# Patient Record
Sex: MALE | Race: WHITE | NOT HISPANIC OR LATINO
[De-identification: names, ages, dates, MRNs, and addresses within clinical notes are randomized per-mention and may not be internally consistent; named-entity substitution may affect disease eponyms.]

---

## 2017-02-20 ENCOUNTER — RX RENEWAL (OUTPATIENT)
Age: 52
End: 2017-02-20

## 2017-03-27 ENCOUNTER — RX RENEWAL (OUTPATIENT)
Age: 52
End: 2017-03-27

## 2017-04-26 ENCOUNTER — APPOINTMENT (OUTPATIENT)
Dept: CARDIOLOGY | Facility: CLINIC | Age: 52
End: 2017-04-26

## 2017-04-26 ENCOUNTER — NON-APPOINTMENT (OUTPATIENT)
Age: 52
End: 2017-04-26

## 2017-04-26 VITALS
SYSTOLIC BLOOD PRESSURE: 106 MMHG | WEIGHT: 224 LBS | BODY MASS INDEX: 32.07 KG/M2 | HEART RATE: 72 BPM | DIASTOLIC BLOOD PRESSURE: 62 MMHG | HEIGHT: 70 IN

## 2017-05-22 ENCOUNTER — RX RENEWAL (OUTPATIENT)
Age: 52
End: 2017-05-22

## 2017-09-12 ENCOUNTER — MEDICATION RENEWAL (OUTPATIENT)
Age: 52
End: 2017-09-12

## 2017-10-11 ENCOUNTER — APPOINTMENT (OUTPATIENT)
Dept: CARDIOLOGY | Facility: CLINIC | Age: 52
End: 2017-10-11
Payer: COMMERCIAL

## 2017-10-11 ENCOUNTER — NON-APPOINTMENT (OUTPATIENT)
Age: 52
End: 2017-10-11

## 2017-10-11 VITALS
HEART RATE: 80 BPM | DIASTOLIC BLOOD PRESSURE: 66 MMHG | BODY MASS INDEX: 31.07 KG/M2 | SYSTOLIC BLOOD PRESSURE: 122 MMHG | HEIGHT: 70 IN | WEIGHT: 217 LBS

## 2017-10-11 PROCEDURE — 99214 OFFICE O/P EST MOD 30 MIN: CPT

## 2017-10-11 PROCEDURE — 93000 ELECTROCARDIOGRAM COMPLETE: CPT

## 2018-03-06 ENCOUNTER — RX RENEWAL (OUTPATIENT)
Age: 53
End: 2018-03-06

## 2018-04-02 ENCOUNTER — RX RENEWAL (OUTPATIENT)
Age: 53
End: 2018-04-02

## 2018-04-25 ENCOUNTER — NON-APPOINTMENT (OUTPATIENT)
Age: 53
End: 2018-04-25

## 2018-04-25 ENCOUNTER — APPOINTMENT (OUTPATIENT)
Dept: CARDIOLOGY | Facility: CLINIC | Age: 53
End: 2018-04-25
Payer: COMMERCIAL

## 2018-04-25 VITALS
HEIGHT: 70 IN | BODY MASS INDEX: 30.64 KG/M2 | OXYGEN SATURATION: 99 % | WEIGHT: 214 LBS | SYSTOLIC BLOOD PRESSURE: 120 MMHG | DIASTOLIC BLOOD PRESSURE: 72 MMHG | HEART RATE: 80 BPM

## 2018-04-25 PROCEDURE — 93000 ELECTROCARDIOGRAM COMPLETE: CPT

## 2018-04-25 PROCEDURE — 99214 OFFICE O/P EST MOD 30 MIN: CPT

## 2018-05-30 ENCOUNTER — RX RENEWAL (OUTPATIENT)
Age: 53
End: 2018-05-30

## 2018-08-22 ENCOUNTER — MEDICATION RENEWAL (OUTPATIENT)
Age: 53
End: 2018-08-22

## 2018-08-26 ENCOUNTER — RX RENEWAL (OUTPATIENT)
Age: 53
End: 2018-08-26

## 2018-10-16 ENCOUNTER — NON-APPOINTMENT (OUTPATIENT)
Age: 53
End: 2018-10-16

## 2018-10-16 ENCOUNTER — APPOINTMENT (OUTPATIENT)
Dept: CARDIOLOGY | Facility: CLINIC | Age: 53
End: 2018-10-16
Payer: COMMERCIAL

## 2018-10-16 VITALS
BODY MASS INDEX: 30.21 KG/M2 | DIASTOLIC BLOOD PRESSURE: 77 MMHG | SYSTOLIC BLOOD PRESSURE: 118 MMHG | HEART RATE: 76 BPM | OXYGEN SATURATION: 100 % | HEIGHT: 70 IN | WEIGHT: 211 LBS

## 2018-10-16 PROCEDURE — 99214 OFFICE O/P EST MOD 30 MIN: CPT

## 2018-10-16 PROCEDURE — 93000 ELECTROCARDIOGRAM COMPLETE: CPT

## 2019-02-11 ENCOUNTER — RX RENEWAL (OUTPATIENT)
Age: 54
End: 2019-02-11

## 2019-03-11 ENCOUNTER — RX RENEWAL (OUTPATIENT)
Age: 54
End: 2019-03-11

## 2019-03-12 ENCOUNTER — TRANSCRIPTION ENCOUNTER (OUTPATIENT)
Age: 54
End: 2019-03-12

## 2019-04-17 ENCOUNTER — APPOINTMENT (OUTPATIENT)
Dept: CARDIOLOGY | Facility: CLINIC | Age: 54
End: 2019-04-17
Payer: COMMERCIAL

## 2019-04-17 ENCOUNTER — NON-APPOINTMENT (OUTPATIENT)
Age: 54
End: 2019-04-17

## 2019-04-17 VITALS
BODY MASS INDEX: 31.42 KG/M2 | HEART RATE: 75 BPM | DIASTOLIC BLOOD PRESSURE: 74 MMHG | OXYGEN SATURATION: 99 % | WEIGHT: 219 LBS | SYSTOLIC BLOOD PRESSURE: 124 MMHG

## 2019-04-17 PROCEDURE — 99214 OFFICE O/P EST MOD 30 MIN: CPT

## 2019-04-17 PROCEDURE — 93000 ELECTROCARDIOGRAM COMPLETE: CPT

## 2019-04-17 RX ORDER — AMOXICILLIN 875 MG/1
875 TABLET, FILM COATED ORAL
Qty: 20 | Refills: 0 | Status: COMPLETED | COMMUNITY
Start: 2018-12-31

## 2019-04-17 NOTE — DISCUSSION/SUMMARY
[Coronary Artery Disease] : coronary artery disease [Stable] : stable [Hyperlipidemia] : hyperlipidemia [None] : none [de-identified] : weighing risks and benefits will continue DAPT [Patient] : the patient

## 2019-04-17 NOTE — CARDIOLOGY SUMMARY
[___] : [unfilled] [LVEF ___%] : LVEF [unfilled]% [Mild] : mild LV dysfunction [Normal] : normal LA size [None] : no mitral regurgitation

## 2019-04-17 NOTE — HISTORY OF PRESENT ILLNESS
[FreeTextEntry1] : Mr. Kain Booker is a 54 year old man who experienced breathlessness and a vague discomfort in his throat while exercising in gym, electrocardiogram abnormal, cardiac enzymes elevated and had drug-eluting stent to the proximal left anterior descending 99% lesion. History of hyperlipidemia and diabetes, lost weight, exercised regularly and says results improved and medications were discontinued. There is no family history of cardiac disease and he is a nonsmoker. Fully active without any chest discomfort or dyspnea, walking good weather and bad up to 7 miles a day, often on golf course. He is a former [place]  retired on disability.

## 2019-04-17 NOTE — PHYSICAL EXAM
[General Appearance - Well Developed] : well developed [Normal Appearance] : normal appearance [Well Groomed] : well groomed [No Deformities] : no deformities [General Appearance - Well Nourished] : well nourished [General Appearance - In No Acute Distress] : no acute distress [Normal Conjunctiva] : the conjunctiva exhibited no abnormalities [Eyelids - No Xanthelasma] : the eyelids demonstrated no xanthelasmas [Normal Oral Mucosa] : normal oral mucosa [No Oral Pallor] : no oral pallor [No Oral Cyanosis] : no oral cyanosis [Normal Jugular Venous A Waves Present] : normal jugular venous A waves present [Normal Jugular Venous V Waves Present] : normal jugular venous V waves present [No Jugular Venous Mota A Waves] : no jugular venous mota A waves [Respiration, Rhythm And Depth] : normal respiratory rhythm and effort [Exaggerated Use Of Accessory Muscles For Inspiration] : no accessory muscle use [Auscultation Breath Sounds / Voice Sounds] : lungs were clear to auscultation bilaterally [Not Palpable] : not palpable [Normal Rate] : normal [Rhythm Regular] : regular [Normal S1] : normal S1 [Normal S2] : normal S2 [No Gallop] : no gallop heard [No Murmur] : no murmurs heard [No Abnormalities] : the abdominal aorta was not enlarged and no bruit was heard [2+] : right 2+ [No Pitting Edema] : no pitting edema present [Abdomen Soft] : soft [Abdomen Tenderness] : non-tender [Abdomen Mass (___ Cm)] : no abdominal mass palpated [Abnormal Walk] : normal gait [Gait - Sufficient For Exercise Testing] : the gait was sufficient for exercise testing [Nail Clubbing] : no clubbing of the fingernails [Cyanosis, Localized] : no localized cyanosis [Petechial Hemorrhages (___cm)] : no petechial hemorrhages [Skin Color & Pigmentation] : normal skin color and pigmentation [] : no rash [Skin Lesions] : no skin lesions [No Venous Stasis] : no venous stasis [No Xanthoma] : no  xanthoma was observed [No Skin Ulcers] : no skin ulcer [Oriented To Time, Place, And Person] : oriented to person, place, and time [Affect] : the affect was normal [No Anxiety] : not feeling anxious [Mood] : the mood was normal [Click] : no click [Right Carotid Bruit] : no bruit heard over the right carotid [Left Carotid Bruit] : no bruit heard over the left carotid

## 2019-04-17 NOTE — REVIEW OF SYSTEMS
[Joint Pain] : joint pain [Negative] : Heme/Lymph [Shortness Of Breath] : no shortness of breath [Dyspnea on exertion] : not dyspnea during exertion [Chest Pain] : no chest pain [Palpitations] : no palpitations [Leg Claudication] : no intermittent leg claudication

## 2019-05-08 ENCOUNTER — RX RENEWAL (OUTPATIENT)
Age: 54
End: 2019-05-08

## 2019-09-24 ENCOUNTER — RX RENEWAL (OUTPATIENT)
Age: 54
End: 2019-09-24

## 2019-10-16 ENCOUNTER — NON-APPOINTMENT (OUTPATIENT)
Age: 54
End: 2019-10-16

## 2019-10-16 ENCOUNTER — APPOINTMENT (OUTPATIENT)
Dept: CARDIOLOGY | Facility: CLINIC | Age: 54
End: 2019-10-16
Payer: COMMERCIAL

## 2019-10-16 VITALS
OXYGEN SATURATION: 97 % | DIASTOLIC BLOOD PRESSURE: 60 MMHG | BODY MASS INDEX: 30.99 KG/M2 | SYSTOLIC BLOOD PRESSURE: 110 MMHG | HEART RATE: 77 BPM | WEIGHT: 216 LBS

## 2019-10-16 PROCEDURE — 93000 ELECTROCARDIOGRAM COMPLETE: CPT

## 2019-10-16 PROCEDURE — 99215 OFFICE O/P EST HI 40 MIN: CPT

## 2019-10-16 NOTE — DISCUSSION/SUMMARY
[Coronary Artery Disease] : coronary artery disease [Hyperlipidemia] : hyperlipidemia [None] : none [Stable] : stable [de-identified] : weighing risks and benefits will continue DAPT

## 2019-10-16 NOTE — REVIEW OF SYSTEMS
[Joint Pain] : joint pain [Negative] : Endocrine [Shortness Of Breath] : no shortness of breath [Chest Pain] : no chest pain [Dyspnea on exertion] : not dyspnea during exertion [Leg Claudication] : no intermittent leg claudication [Palpitations] : no palpitations

## 2019-10-16 NOTE — PHYSICAL EXAM
[General Appearance - Well Developed] : well developed [Normal Appearance] : normal appearance [Well Groomed] : well groomed [General Appearance - Well Nourished] : well nourished [No Deformities] : no deformities [General Appearance - In No Acute Distress] : no acute distress [Normal Conjunctiva] : the conjunctiva exhibited no abnormalities [No Oral Pallor] : no oral pallor [Normal Oral Mucosa] : normal oral mucosa [Eyelids - No Xanthelasma] : the eyelids demonstrated no xanthelasmas [No Oral Cyanosis] : no oral cyanosis [Normal Jugular Venous A Waves Present] : normal jugular venous A waves present [Normal Jugular Venous V Waves Present] : normal jugular venous V waves present [No Jugular Venous Mota A Waves] : no jugular venous mota A waves [Respiration, Rhythm And Depth] : normal respiratory rhythm and effort [Exaggerated Use Of Accessory Muscles For Inspiration] : no accessory muscle use [Not Palpable] : not palpable [Auscultation Breath Sounds / Voice Sounds] : lungs were clear to auscultation bilaterally [Rhythm Regular] : regular [Normal Rate] : normal [Normal S1] : normal S1 [Normal S2] : normal S2 [No Gallop] : no gallop heard [No Murmur] : no murmurs heard [2+] : left 2+ [No Abnormalities] : the abdominal aorta was not enlarged and no bruit was heard [No Pitting Edema] : no pitting edema present [Abdomen Soft] : soft [Abdomen Tenderness] : non-tender [Abnormal Walk] : normal gait [Abdomen Mass (___ Cm)] : no abdominal mass palpated [Nail Clubbing] : no clubbing of the fingernails [Gait - Sufficient For Exercise Testing] : the gait was sufficient for exercise testing [Petechial Hemorrhages (___cm)] : no petechial hemorrhages [Cyanosis, Localized] : no localized cyanosis [Skin Color & Pigmentation] : normal skin color and pigmentation [] : no rash [No Venous Stasis] : no venous stasis [Skin Lesions] : no skin lesions [No Skin Ulcers] : no skin ulcer [No Xanthoma] : no  xanthoma was observed [Oriented To Time, Place, And Person] : oriented to person, place, and time [Affect] : the affect was normal [Mood] : the mood was normal [No Anxiety] : not feeling anxious [Click] : no click [Right Carotid Bruit] : no bruit heard over the right carotid [Left Carotid Bruit] : no bruit heard over the left carotid

## 2020-01-18 ENCOUNTER — RX RENEWAL (OUTPATIENT)
Age: 55
End: 2020-01-18

## 2020-02-18 ENCOUNTER — RX RENEWAL (OUTPATIENT)
Age: 55
End: 2020-02-18

## 2020-04-15 ENCOUNTER — APPOINTMENT (OUTPATIENT)
Dept: CARDIOLOGY | Facility: CLINIC | Age: 55
End: 2020-04-15

## 2020-04-19 ENCOUNTER — RX RENEWAL (OUTPATIENT)
Age: 55
End: 2020-04-19

## 2020-06-10 ENCOUNTER — APPOINTMENT (OUTPATIENT)
Dept: CARDIOLOGY | Facility: CLINIC | Age: 55
End: 2020-06-10
Payer: COMMERCIAL

## 2020-06-10 ENCOUNTER — NON-APPOINTMENT (OUTPATIENT)
Age: 55
End: 2020-06-10

## 2020-06-10 VITALS
SYSTOLIC BLOOD PRESSURE: 118 MMHG | HEIGHT: 70 IN | TEMPERATURE: 97.7 F | OXYGEN SATURATION: 99 % | BODY MASS INDEX: 30.06 KG/M2 | HEART RATE: 62 BPM | WEIGHT: 210 LBS | DIASTOLIC BLOOD PRESSURE: 73 MMHG | RESPIRATION RATE: 17 BRPM

## 2020-06-10 PROCEDURE — 93000 ELECTROCARDIOGRAM COMPLETE: CPT

## 2020-06-10 PROCEDURE — 99214 OFFICE O/P EST MOD 30 MIN: CPT

## 2020-06-10 NOTE — PHYSICAL EXAM
[General Appearance - Well Developed] : well developed [Normal Appearance] : normal appearance [Well Groomed] : well groomed [General Appearance - Well Nourished] : well nourished [No Deformities] : no deformities [General Appearance - In No Acute Distress] : no acute distress [Normal Conjunctiva] : the conjunctiva exhibited no abnormalities [Eyelids - No Xanthelasma] : the eyelids demonstrated no xanthelasmas [Normal Oral Mucosa] : normal oral mucosa [No Oral Pallor] : no oral pallor [No Oral Cyanosis] : no oral cyanosis [Normal Jugular Venous A Waves Present] : normal jugular venous A waves present [Normal Jugular Venous V Waves Present] : normal jugular venous V waves present [No Jugular Venous Mota A Waves] : no jugular venous mota A waves [Respiration, Rhythm And Depth] : normal respiratory rhythm and effort [Exaggerated Use Of Accessory Muscles For Inspiration] : no accessory muscle use [Auscultation Breath Sounds / Voice Sounds] : lungs were clear to auscultation bilaterally [Not Palpable] : not palpable [Normal Rate] : normal [Rhythm Regular] : regular [Normal S1] : normal S1 [Normal S2] : normal S2 [No Gallop] : no gallop heard [No Murmur] : no murmurs heard [2+] : left 2+ [No Abnormalities] : the abdominal aorta was not enlarged and no bruit was heard [No Pitting Edema] : no pitting edema present [Abdomen Soft] : soft [Abdomen Tenderness] : non-tender [Abdomen Mass (___ Cm)] : no abdominal mass palpated [Abnormal Walk] : normal gait [Gait - Sufficient For Exercise Testing] : the gait was sufficient for exercise testing [Nail Clubbing] : no clubbing of the fingernails [Cyanosis, Localized] : no localized cyanosis [Petechial Hemorrhages (___cm)] : no petechial hemorrhages [Skin Color & Pigmentation] : normal skin color and pigmentation [] : no rash [No Venous Stasis] : no venous stasis [Skin Lesions] : no skin lesions [No Skin Ulcers] : no skin ulcer [No Xanthoma] : no  xanthoma was observed [Oriented To Time, Place, And Person] : oriented to person, place, and time [Affect] : the affect was normal [Mood] : the mood was normal [No Anxiety] : not feeling anxious [Click] : no click [Right Carotid Bruit] : no bruit heard over the right carotid [Left Carotid Bruit] : no bruit heard over the left carotid

## 2020-06-10 NOTE — REVIEW OF SYSTEMS
[Joint Pain] : joint pain [Negative] : Heme/Lymph [Shortness Of Breath] : no shortness of breath [Dyspnea on exertion] : not dyspnea during exertion [Chest Pain] : no chest pain [Leg Claudication] : no intermittent leg claudication [Palpitations] : no palpitations

## 2020-06-10 NOTE — DISCUSSION/SUMMARY
[Coronary Artery Disease] : coronary artery disease [Hyperlipidemia] : hyperlipidemia [Stable] : stable [None] : none [Patient] : the patient [de-identified] : each visit weighing risks and benefits will continue DAPT

## 2020-06-10 NOTE — HISTORY OF PRESENT ILLNESS
[FreeTextEntry1] : Mr. Kain Booker is a 55 year old man who experienced breathlessness and a vague discomfort in his throat while exercising in gym, electrocardiogram abnormal, cardiac enzymes elevated and had drug-eluting stent to the proximal left anterior descending artery 99% lesion. History of hyperlipidemia and diabetes, lost weight, exercised regularly and says results improved and medications were discontinued. There is no family history of cardiac disease and he is a nonsmoker. Fully active without any chest discomfort or dyspnea, walking in good weather and bad up to 7 miles a day, often on golf course. He is a former [place]  retired on disability.\par \par At visit today confirms all above unchanged, remains very active and unrestricted.

## 2020-09-12 ENCOUNTER — RX RENEWAL (OUTPATIENT)
Age: 55
End: 2020-09-12

## 2020-12-08 ENCOUNTER — NON-APPOINTMENT (OUTPATIENT)
Age: 55
End: 2020-12-08

## 2020-12-08 ENCOUNTER — APPOINTMENT (OUTPATIENT)
Dept: CARDIOLOGY | Facility: CLINIC | Age: 55
End: 2020-12-08
Payer: COMMERCIAL

## 2020-12-08 VITALS
SYSTOLIC BLOOD PRESSURE: 122 MMHG | OXYGEN SATURATION: 97 % | WEIGHT: 210 LBS | DIASTOLIC BLOOD PRESSURE: 70 MMHG | HEIGHT: 70 IN | BODY MASS INDEX: 30.06 KG/M2 | TEMPERATURE: 97.3 F | HEART RATE: 80 BPM

## 2020-12-08 PROCEDURE — 99214 OFFICE O/P EST MOD 30 MIN: CPT

## 2020-12-08 PROCEDURE — 93000 ELECTROCARDIOGRAM COMPLETE: CPT

## 2020-12-08 PROCEDURE — 99072 ADDL SUPL MATRL&STAF TM PHE: CPT

## 2020-12-08 NOTE — DISCUSSION/SUMMARY
[Hyperlipidemia] : hyperlipidemia [Patient] : the patient [Coronary Artery Disease] : coronary artery disease [Stable] : stable [None] : none [de-identified] : each visit weighing risks and benefits will continue DAPT

## 2020-12-08 NOTE — HISTORY OF PRESENT ILLNESS
[FreeTextEntry1] : Mr. Kain Booker is a 55 year old man who experienced breathlessness and a vague discomfort in his throat while exercising in gym, electrocardiogram abnormal, cardiac enzymes elevated and had drug-eluting stent to the proximal left anterior descending artery 99% lesion. History of hyperlipidemia and diabetes, lost weight, exercised regularly and says results improved and medications were discontinued. There is no family history of cardiac disease and he is a nonsmoker. Fully active without any chest discomfort or dyspnea, walking in good weather and bad up to 7 miles a day, often on golf course. He is a former [place]  retired on disability.

## 2021-01-17 ENCOUNTER — RX RENEWAL (OUTPATIENT)
Age: 56
End: 2021-01-17

## 2021-02-23 ENCOUNTER — RX RENEWAL (OUTPATIENT)
Age: 56
End: 2021-02-23

## 2021-03-15 ENCOUNTER — NON-APPOINTMENT (OUTPATIENT)
Age: 56
End: 2021-03-15

## 2021-03-15 ENCOUNTER — TRANSCRIPTION ENCOUNTER (OUTPATIENT)
Age: 56
End: 2021-03-15

## 2021-03-23 ENCOUNTER — APPOINTMENT (OUTPATIENT)
Dept: CARDIOLOGY | Facility: CLINIC | Age: 56
End: 2021-03-23
Payer: COMMERCIAL

## 2021-03-23 ENCOUNTER — NON-APPOINTMENT (OUTPATIENT)
Age: 56
End: 2021-03-23

## 2021-03-23 VITALS
TEMPERATURE: 98.1 F | WEIGHT: 218 LBS | BODY MASS INDEX: 31.21 KG/M2 | HEIGHT: 70 IN | SYSTOLIC BLOOD PRESSURE: 135 MMHG | OXYGEN SATURATION: 100 % | DIASTOLIC BLOOD PRESSURE: 82 MMHG | HEART RATE: 84 BPM

## 2021-03-23 DIAGNOSIS — Z01.810 ENCOUNTER FOR PREPROCEDURAL CARDIOVASCULAR EXAMINATION: ICD-10-CM

## 2021-03-23 PROCEDURE — 93000 ELECTROCARDIOGRAM COMPLETE: CPT

## 2021-03-23 PROCEDURE — 99215 OFFICE O/P EST HI 40 MIN: CPT

## 2021-03-23 PROCEDURE — 99072 ADDL SUPL MATRL&STAF TM PHE: CPT

## 2021-03-23 NOTE — DISCUSSION/SUMMARY
[Coronary Artery Disease] : coronary artery disease [Medication Changes Per Orders] : as documented in orders [Hyperlipidemia] : hyperlipidemia [Stable] : stable [None] : none [Patient] : the patient [de-identified] : now years since stent placement will discontinue prasugrel and maintain on aspirin alone [FreeTextEntry1] : \par Acute anterior wall MI 7 years ago, stent placement and excellent recovery with preserved ventricular function and no recurrent myocardial ischemia. Has never manifested heart failure, has no valvular heart disease and rhythm is stable. Thus there is no contraindication to gastrointestinal endoscopic procedure and sedation/analgesia. Prasugrel has been discontinued and will not be resumed, no longer indicated this many years since stent placement. He will remain on aspirin and other cardiac medications.

## 2021-03-23 NOTE — HISTORY OF PRESENT ILLNESS
[FreeTextEntry1] : Mr. Kain Booker is a 56 year old man who experienced breathlessness and a vague discomfort in his throat while exercising in gym, electrocardiogram abnormal, cardiac enzymes elevated and had drug-eluting stent to the proximal left anterior descending artery 99% lesion. History of hyperlipidemia and diabetes, lost weight, exercised regularly. There is no family history of cardiac disease and he is a nonsmoker. Fully active without any chest discomfort or dyspnea, walking in good weather and bad up to 7 miles a day, often on golf course. He is a former [place]  retired on disability.

## 2021-03-23 NOTE — REASON FOR VISIT
[Follow-Up - Clinic] : a clinic follow-up of [Coronary Artery Disease] : coronary artery disease [Hyperlipidemia] : hyperlipidemia [FreeTextEntry1] : Pre-procedure cardiovascular evaluation

## 2021-06-10 ENCOUNTER — NON-APPOINTMENT (OUTPATIENT)
Age: 56
End: 2021-06-10

## 2021-06-10 ENCOUNTER — APPOINTMENT (OUTPATIENT)
Dept: CARDIOLOGY | Facility: CLINIC | Age: 56
End: 2021-06-10
Payer: COMMERCIAL

## 2021-06-10 VITALS
TEMPERATURE: 98 F | HEIGHT: 70 IN | BODY MASS INDEX: 30.49 KG/M2 | DIASTOLIC BLOOD PRESSURE: 78 MMHG | SYSTOLIC BLOOD PRESSURE: 124 MMHG | HEART RATE: 75 BPM | OXYGEN SATURATION: 100 % | RESPIRATION RATE: 17 BRPM | WEIGHT: 213 LBS

## 2021-06-10 PROCEDURE — 93000 ELECTROCARDIOGRAM COMPLETE: CPT

## 2021-06-10 PROCEDURE — 99214 OFFICE O/P EST MOD 30 MIN: CPT

## 2021-06-10 NOTE — DISCUSSION/SUMMARY
[Coronary Artery Disease] : coronary artery disease [Medication Changes Per Orders] : Medication changes are as documented in orders [Hyperlipidemia] : hyperlipidemia [Stable] : stable [None] : There are no changes in medication management [Patient] : the patient [de-identified] : stop prasugrel, 7 years since stent, actually stopped prior to colonoscopy 3 months ago and has not resumed

## 2021-06-10 NOTE — HISTORY OF PRESENT ILLNESS
[FreeTextEntry1] : Mr. Kain Booker is a 56 year old man who experienced breathlessness and a vague discomfort in his throat while exercising in gym, electrocardiogram abnormal, cardiac enzymes elevated and had drug-eluting stent to the proximal left anterior descending artery 99% lesion. History of hyperlipidemia and diabetes, lost weight, exercised regularly. There is no family history of cardiac disease and he is a nonsmoker. Fully active without any chest discomfort or dyspnea, walking in good weather and bad up to 4-5 miles a day, often on golf course. He is a former [place]  retired on disability.

## 2021-06-10 NOTE — CARDIOLOGY SUMMARY
[___] : [unfilled] [LVEF ___%] : LVEF [unfilled]% [Mild] : mild LV dysfunction [Normal] : normal LA size [None] : no mitral regurgitation [de-identified] : 6/10/2021 normal trace [de-identified] : 3/26/2014 RADAMES to 95% proximal LAD stenosis (reviewed angio 6/10/2021)

## 2021-06-10 NOTE — PHYSICAL EXAM
[Not Palpable] : not palpable [Normal Rate] : normal [Rhythm Regular] : regular [Normal S1] : normal S1 [Normal S2] : normal S2 [No Murmur] : no murmurs heard [No Pitting Edema] : no pitting edema present [2+] : left 2+ [No Abnormalities] : the abdominal aorta was not enlarged and no bruit was heard [Normal] : alert and oriented, normal memory [Right Carotid Bruit] : no bruit heard over the right carotid [Left Carotid Bruit] : no bruit heard over the left carotid

## 2021-06-16 ENCOUNTER — RX RENEWAL (OUTPATIENT)
Age: 56
End: 2021-06-16

## 2021-12-09 ENCOUNTER — APPOINTMENT (OUTPATIENT)
Dept: CARDIOLOGY | Facility: CLINIC | Age: 56
End: 2021-12-09
Payer: COMMERCIAL

## 2021-12-09 ENCOUNTER — NON-APPOINTMENT (OUTPATIENT)
Age: 56
End: 2021-12-09

## 2021-12-09 VITALS
WEIGHT: 219 LBS | HEIGHT: 70 IN | DIASTOLIC BLOOD PRESSURE: 78 MMHG | HEART RATE: 72 BPM | SYSTOLIC BLOOD PRESSURE: 120 MMHG | OXYGEN SATURATION: 99 % | BODY MASS INDEX: 31.35 KG/M2

## 2021-12-09 PROCEDURE — 99214 OFFICE O/P EST MOD 30 MIN: CPT

## 2021-12-09 PROCEDURE — 93000 ELECTROCARDIOGRAM COMPLETE: CPT

## 2021-12-09 NOTE — DISCUSSION/SUMMARY
[Coronary Artery Disease] : coronary artery disease [Hyperlipidemia] : hyperlipidemia [Responding to Treatment] : responding to treatment [None] : There are no changes in medication management

## 2021-12-09 NOTE — CARDIOLOGY SUMMARY
[___] : [unfilled] [LVEF ___%] : LVEF [unfilled]% [Mild] : mild LV dysfunction [Normal] : normal LA size [None] : no mitral regurgitation [___] : [unfilled] [de-identified] : 6/10/2021 normal trace\par 12/9/2021 sinus rhythm, normal trace, unchanged. [de-identified] : 3/26/2014 RADAMES to 95% proximal LAD stenosis (reviewed angio 6/10/2021)

## 2021-12-18 ENCOUNTER — RX RENEWAL (OUTPATIENT)
Age: 56
End: 2021-12-18

## 2022-04-21 ENCOUNTER — RX RENEWAL (OUTPATIENT)
Age: 57
End: 2022-04-21

## 2022-06-09 ENCOUNTER — APPOINTMENT (OUTPATIENT)
Dept: CARDIOLOGY | Facility: CLINIC | Age: 57
End: 2022-06-09
Payer: COMMERCIAL

## 2022-06-09 ENCOUNTER — NON-APPOINTMENT (OUTPATIENT)
Age: 57
End: 2022-06-09

## 2022-06-09 VITALS — HEART RATE: 64 BPM | SYSTOLIC BLOOD PRESSURE: 128 MMHG | DIASTOLIC BLOOD PRESSURE: 70 MMHG

## 2022-06-09 VITALS
HEART RATE: 64 BPM | DIASTOLIC BLOOD PRESSURE: 70 MMHG | WEIGHT: 219 LBS | OXYGEN SATURATION: 98 % | SYSTOLIC BLOOD PRESSURE: 132 MMHG | HEIGHT: 70 IN | BODY MASS INDEX: 31.35 KG/M2

## 2022-06-09 PROCEDURE — 99214 OFFICE O/P EST MOD 30 MIN: CPT

## 2022-06-09 PROCEDURE — 93000 ELECTROCARDIOGRAM COMPLETE: CPT

## 2022-06-09 NOTE — HISTORY OF PRESENT ILLNESS
[FreeTextEntry1] : Mr. Kain Booker is a 57 year old man who experienced breathlessness and a vague discomfort in his throat while exercising in gym, electrocardiogram abnormal, cardiac enzymes elevated and had drug-eluting stent to the proximal left anterior descending artery 99% lesion. History of hyperlipidemia and diabetes, lost weight, exercised regularly. There is no family history of cardiac disease and he is a nonsmoker. Fully active without any chest discomfort or dyspnea, walking in good weather and bad up to 4-5 miles a day, often on golf course. He is a former [place]  retired on disability.\par \par Moved to [place], [place] and is active all year round, walking, playing golf and has no symptoms.

## 2022-06-09 NOTE — DISCUSSION/SUMMARY
[Coronary Artery Disease] : coronary artery disease [Hyperlipidemia] : hyperlipidemia [Responding to Treatment] : responding to treatment [None] : There are no changes in medication management [Patient] : the patient

## 2022-06-09 NOTE — CARDIOLOGY SUMMARY
[___] : [unfilled] [LVEF ___%] : LVEF [unfilled]% [Mild] : mild LV dysfunction [Normal] : normal LA size [None] : no mitral regurgitation [de-identified] : 6/10/2021 normal trace\par 12/9/2021 sinus rhythm, normal trace, unchanged.\par 6/9/2022 sinus rhythm, unchanged. [de-identified] : 3/26/2014 RADAMES to 95% proximal LAD stenosis (reviewed angio 6/10/2021)

## 2022-06-16 ENCOUNTER — RX RENEWAL (OUTPATIENT)
Age: 57
End: 2022-06-16

## 2022-06-20 ENCOUNTER — RX RENEWAL (OUTPATIENT)
Age: 57
End: 2022-06-20

## 2022-12-22 ENCOUNTER — RX RENEWAL (OUTPATIENT)
Age: 57
End: 2022-12-22

## 2022-12-24 ENCOUNTER — NON-APPOINTMENT (OUTPATIENT)
Age: 57
End: 2022-12-24

## 2022-12-29 ENCOUNTER — APPOINTMENT (OUTPATIENT)
Dept: CARDIOLOGY | Facility: CLINIC | Age: 57
End: 2022-12-29

## 2022-12-29 ENCOUNTER — NON-APPOINTMENT (OUTPATIENT)
Age: 57
End: 2022-12-29

## 2022-12-29 VITALS
HEART RATE: 65 BPM | WEIGHT: 217 LBS | HEIGHT: 70 IN | BODY MASS INDEX: 31.07 KG/M2 | DIASTOLIC BLOOD PRESSURE: 67 MMHG | SYSTOLIC BLOOD PRESSURE: 109 MMHG | OXYGEN SATURATION: 98 %

## 2022-12-29 DIAGNOSIS — E11.9 TYPE 2 DIABETES MELLITUS W/OUT COMPLICATIONS: ICD-10-CM

## 2022-12-29 DIAGNOSIS — I21.09 ST ELEVATION (STEMI) MYOCARDIAL INFARCTION INVOLVING OTHER CORONARY ARTERY OF ANTERIOR WALL: ICD-10-CM

## 2022-12-29 PROCEDURE — 99214 OFFICE O/P EST MOD 30 MIN: CPT | Mod: 25

## 2022-12-29 PROCEDURE — 93000 ELECTROCARDIOGRAM COMPLETE: CPT

## 2022-12-29 RX ORDER — METFORMIN HYDROCHLORIDE 1000 MG/1
1000 TABLET, COATED ORAL
Refills: 0 | Status: ACTIVE | COMMUNITY
Start: 2022-12-29

## 2022-12-29 RX ORDER — EMPAGLIFLOZIN 10 MG/1
10 TABLET, FILM COATED ORAL
Qty: 90 | Refills: 3 | Status: ACTIVE | COMMUNITY
Start: 2022-12-29

## 2022-12-29 NOTE — DISCUSSION/SUMMARY
[Patient] : the patient [EKG obtained to assist in diagnosis and management of assessed problem(s)] : EKG obtained to assist in diagnosis and management of assessed problem(s) [Coronary Artery Disease] : coronary artery disease [Hyperlipidemia] : hyperlipidemia [Responding to Treatment] : responding to treatment [None] : There are no changes in medication management

## 2022-12-29 NOTE — CARDIOLOGY SUMMARY
[___] : [unfilled] [LVEF ___%] : LVEF [unfilled]% [Mild] : mild LV dysfunction [Normal] : normal LA size [None] : no mitral regurgitation [de-identified] : 6/10/2021 normal trace\par 12/9/2021 sinus rhythm, normal trace, unchanged.\par 6/9/2022 sinus rhythm, unchanged.\par 12/29/2022 sinus rhythm, unchanged. [de-identified] : 3/26/2014 RADAMES to 95% proximal LAD stenosis (reviewed angio 6/10/2021)

## 2022-12-29 NOTE — HISTORY OF PRESENT ILLNESS
[FreeTextEntry1] : Mr. Kain Booker is a 57 year old man who experienced breathlessness and a vague discomfort in his throat while exercising in gym, electrocardiogram abnormal, cardiac enzymes elevated and had drug-eluting stent to the proximal left anterior descending artery 99% lesion. History of hyperlipidemia and diabetes, lost weight, exercised regularly. There is no family history of cardiac disease and he is a nonsmoker. Fully active without any chest discomfort or dyspnea, walking in good weather and bad up to 4-5 miles a day, often on golf course. He is a former [place]  retired on disability.\par \par Moved to [place], [place] and is active all year round, walking, playing golf and has no symptoms. Has been very active, exercise with , walking, including on golf course. Arrived by plane yesterday and leaving to [place] and a cruise.

## 2023-06-23 ENCOUNTER — APPOINTMENT (OUTPATIENT)
Dept: CARDIOLOGY | Facility: CLINIC | Age: 58
End: 2023-06-23
Payer: COMMERCIAL

## 2023-06-23 ENCOUNTER — NON-APPOINTMENT (OUTPATIENT)
Age: 58
End: 2023-06-23

## 2023-06-23 VITALS
SYSTOLIC BLOOD PRESSURE: 118 MMHG | OXYGEN SATURATION: 97 % | HEART RATE: 79 BPM | WEIGHT: 203 LBS | BODY MASS INDEX: 29.13 KG/M2 | DIASTOLIC BLOOD PRESSURE: 80 MMHG

## 2023-06-23 PROCEDURE — 99214 OFFICE O/P EST MOD 30 MIN: CPT | Mod: 25

## 2023-06-23 PROCEDURE — 93000 ELECTROCARDIOGRAM COMPLETE: CPT

## 2023-06-23 RX ORDER — SEMAGLUTIDE 0.68 MG/ML
2 INJECTION, SOLUTION SUBCUTANEOUS WEEKLY
Refills: 0 | Status: ACTIVE | COMMUNITY
Start: 2023-06-23

## 2023-06-23 NOTE — CARDIOLOGY SUMMARY
[de-identified] : 6/10/2021 normal trace\par 12/9/2021 sinus rhythm, normal trace, unchanged.\par 6/9/2022 sinus rhythm, unchanged.\par 12/29/2022 sinus rhythm, unchanged.\par 6/23/2023  [de-identified] : 3/26/2014 RADAMES to 95% proximal LAD stenosis (reviewed angio 6/10/2021)

## 2023-06-23 NOTE — DISCUSSION/SUMMARY
[Patient] : the patient [EKG obtained to assist in diagnosis and management of assessed problem(s)] : EKG obtained to assist in diagnosis and management of assessed problem(s) [Coronary Artery Disease] : coronary artery disease [Medication Changes Per Orders] : Medication changes are as documented in orders [Hyperlipidemia] : hyperlipidemia [Responding to Treatment] : responding to treatment [None] : There are no changes in medication management [de-identified] : trouble by erectile dysfunction, internist in [place] suggested reduce metoprolol, but that would be a minimal dose. This many years since MI with overall preserved LV function, favor discontinue [de-identified] : excellent exercise regimen

## 2023-06-23 NOTE — REVIEW OF SYSTEMS
[Joint Pain] : joint pain [Lower Back Pain] : lower back pain [Negative] : Heme/Lymph [Erectile Dysfunction] : erectile dysfunction

## 2023-06-23 NOTE — HISTORY OF PRESENT ILLNESS
[FreeTextEntry1] : Mr. Kain Booker is a 58 year old man who experienced breathlessness and a vague discomfort in his throat while exercising in gym, electrocardiogram abnormal, cardiac enzymes elevated and had drug-eluting stent to the proximal left anterior descending artery 99% lesion. History of hyperlipidemia and diabetes, lost weight, exercised regularly. There is no family history of cardiac disease and he is a nonsmoker. Fully active without any chest discomfort or dyspnea, walking in good weather and bad up to 4-5 miles a day, often on golf course. He is a former [place]  retired on disability.\par \par Moved to [place], [place] and is active all year round, walking, playing golf and has no symptoms. Has been very active, exercise with , walking, including on golf course. Arrived by plane yesterday and leaving to [place] and a cruise.

## 2023-10-25 ENCOUNTER — RX RENEWAL (OUTPATIENT)
Age: 58
End: 2023-10-25

## 2023-12-19 ENCOUNTER — NON-APPOINTMENT (OUTPATIENT)
Age: 58
End: 2023-12-19

## 2023-12-19 ENCOUNTER — APPOINTMENT (OUTPATIENT)
Dept: CARDIOLOGY | Facility: CLINIC | Age: 58
End: 2023-12-19
Payer: COMMERCIAL

## 2023-12-19 VITALS
SYSTOLIC BLOOD PRESSURE: 128 MMHG | DIASTOLIC BLOOD PRESSURE: 68 MMHG | WEIGHT: 196 LBS | BODY MASS INDEX: 28.12 KG/M2 | OXYGEN SATURATION: 98 % | HEART RATE: 67 BPM

## 2023-12-19 PROCEDURE — 99214 OFFICE O/P EST MOD 30 MIN: CPT | Mod: 25

## 2023-12-19 PROCEDURE — 93000 ELECTROCARDIOGRAM COMPLETE: CPT

## 2023-12-19 NOTE — HISTORY OF PRESENT ILLNESS
[FreeTextEntry1] : Mr. Kain Booker is a 58 year old man who experienced breathlessness and a vague discomfort in his throat while exercising in gym, electrocardiogram abnormal, cardiac enzymes elevated and had drug-eluting stent to the proximal left anterior descending artery 99% lesion. History of hyperlipidemia and diabetes, lost weight, exercised regularly. There is no family history of cardiac disease and he is a nonsmoker. Fully active without any chest discomfort or dyspnea, walking in good weather and bad up to 4-5 miles a day, often on golf course. He is a former [place]  retired on disability.  Moved to [place], [place] and is active all year round, walking, playing golf and has no symptoms. Has been very active, exercise with , walking, including on golf course and pickle ball.

## 2023-12-19 NOTE — DISCUSSION/SUMMARY
[Patient] : the patient [EKG obtained to assist in diagnosis and management of assessed problem(s)] : EKG obtained to assist in diagnosis and management of assessed problem(s) [Coronary Artery Disease] : coronary artery disease [Hyperlipidemia] : hyperlipidemia [Responding to Treatment] : responding to treatment [None] : There are no changes in medication management [de-identified] : excellent exercise regimen

## 2023-12-19 NOTE — CARDIOLOGY SUMMARY
[de-identified] : 6/10/2021 normal trace 12/9/2021 sinus rhythm, normal trace, unchanged. 6/9/2022 sinus rhythm, unchanged. 12/29/2022 sinus rhythm, unchanged. 6/23/2023 12/19/2023 sinus rhythm, normal. [de-identified] : 3/26/2014 RADAMES to 95% proximal LAD stenosis (reviewed angio 6/10/2021)

## 2023-12-19 NOTE — REVIEW OF SYSTEMS
[Erectile Dysfunction] : erectile dysfunction [Joint Pain] : joint pain [Lower Back Pain] : lower back pain [Negative] : Heme/Lymph

## 2024-06-18 ENCOUNTER — NON-APPOINTMENT (OUTPATIENT)
Age: 59
End: 2024-06-18

## 2024-06-18 ENCOUNTER — APPOINTMENT (OUTPATIENT)
Dept: CARDIOLOGY | Facility: CLINIC | Age: 59
End: 2024-06-18
Payer: COMMERCIAL

## 2024-06-18 VITALS
BODY MASS INDEX: 28.84 KG/M2 | HEART RATE: 74 BPM | OXYGEN SATURATION: 100 % | DIASTOLIC BLOOD PRESSURE: 60 MMHG | SYSTOLIC BLOOD PRESSURE: 102 MMHG | WEIGHT: 201 LBS

## 2024-06-18 DIAGNOSIS — E78.5 HYPERLIPIDEMIA, UNSPECIFIED: ICD-10-CM

## 2024-06-18 DIAGNOSIS — I25.10 ATHEROSCLEROTIC HEART DISEASE OF NATIVE CORONARY ARTERY W/OUT ANGINA PECTORIS: ICD-10-CM

## 2024-06-18 PROCEDURE — 99214 OFFICE O/P EST MOD 30 MIN: CPT | Mod: 25

## 2024-06-18 PROCEDURE — G2211 COMPLEX E/M VISIT ADD ON: CPT | Mod: NC

## 2024-06-18 PROCEDURE — 93000 ELECTROCARDIOGRAM COMPLETE: CPT

## 2024-06-18 NOTE — CARDIOLOGY SUMMARY
[de-identified] : 6/10/2021 normal trace 12/9/2021 sinus rhythm, normal trace, unchanged. 6/9/2022 sinus rhythm, unchanged. 12/29/2022 sinus rhythm, unchanged. 6/23/2023 12/19/2023 sinus rhythm, normal. 6/18/2024 sinus rhythm, normal. [de-identified] : 3/26/2014 RADAMES to 95% proximal LAD stenosis (reviewed angio 6/10/2021)

## 2024-06-18 NOTE — REVIEW OF SYSTEMS
[Erectile Dysfunction] : erectile dysfunction [Lower Back Pain] : lower back pain [Negative] : Musculoskeletal

## 2024-06-18 NOTE — DISCUSSION/SUMMARY
[Patient] : the patient [EKG obtained to assist in diagnosis and management of assessed problem(s)] : EKG obtained to assist in diagnosis and management of assessed problem(s) [Coronary Artery Disease] : coronary artery disease [Hyperlipidemia] : hyperlipidemia [Responding to Treatment] : responding to treatment [None] : There are no changes in medication management [de-identified] : excellent exercise regimen

## 2024-06-18 NOTE — HISTORY OF PRESENT ILLNESS
[FreeTextEntry1] : Mr. Kain Booker is a 59 year old man who experienced breathlessness and a vague discomfort in his throat while exercising in gym, electrocardiogram abnormal, cardiac enzymes elevated and had drug-eluting stent to the proximal left anterior descending artery 99% lesion. History of hyperlipidemia and diabetes, lost weight, exercised regularly. There is no family history of cardiac disease and he is a nonsmoker. Fully active without any chest discomfort or dyspnea, walking in good weather and bad up to 4-5 miles a day, often on golf course. He is a former [place]  retired on disability.  Moved to [place], [place] and is active all year round, walking, playing pickle ball, some golf and has no symptoms. Also exercise in gym, low weight, high repetitions, aerobics. Never has had recurrence of exertional dyspnea which was presentation of acute MI.

## 2024-08-29 ENCOUNTER — RX RENEWAL (OUTPATIENT)
Age: 59
End: 2024-08-29

## 2024-12-16 ENCOUNTER — NON-APPOINTMENT (OUTPATIENT)
Age: 59
End: 2024-12-16

## 2024-12-17 ENCOUNTER — APPOINTMENT (OUTPATIENT)
Dept: CARDIOLOGY | Facility: CLINIC | Age: 59
End: 2024-12-17
Payer: COMMERCIAL

## 2024-12-17 ENCOUNTER — NON-APPOINTMENT (OUTPATIENT)
Age: 59
End: 2024-12-17

## 2024-12-17 VITALS
SYSTOLIC BLOOD PRESSURE: 120 MMHG | DIASTOLIC BLOOD PRESSURE: 70 MMHG | OXYGEN SATURATION: 100 % | WEIGHT: 200 LBS | HEIGHT: 70 IN | HEART RATE: 75 BPM | BODY MASS INDEX: 28.63 KG/M2

## 2024-12-17 DIAGNOSIS — I25.10 ATHEROSCLEROTIC HEART DISEASE OF NATIVE CORONARY ARTERY W/OUT ANGINA PECTORIS: ICD-10-CM

## 2024-12-17 DIAGNOSIS — E78.5 HYPERLIPIDEMIA, UNSPECIFIED: ICD-10-CM

## 2024-12-17 PROCEDURE — 99214 OFFICE O/P EST MOD 30 MIN: CPT

## 2024-12-17 PROCEDURE — 93000 ELECTROCARDIOGRAM COMPLETE: CPT

## 2024-12-17 PROCEDURE — G2211 COMPLEX E/M VISIT ADD ON: CPT | Mod: NC

## 2025-06-03 ENCOUNTER — APPOINTMENT (OUTPATIENT)
Dept: CARDIOLOGY | Facility: CLINIC | Age: 60
End: 2025-06-03

## 2025-06-03 ENCOUNTER — NON-APPOINTMENT (OUTPATIENT)
Age: 60
End: 2025-06-03

## 2025-06-03 VITALS
SYSTOLIC BLOOD PRESSURE: 110 MMHG | DIASTOLIC BLOOD PRESSURE: 60 MMHG | OXYGEN SATURATION: 99 % | WEIGHT: 201 LBS | BODY MASS INDEX: 28.84 KG/M2 | HEART RATE: 74 BPM

## 2025-06-03 DIAGNOSIS — E78.5 HYPERLIPIDEMIA, UNSPECIFIED: ICD-10-CM

## 2025-06-03 DIAGNOSIS — I25.10 ATHEROSCLEROTIC HEART DISEASE OF NATIVE CORONARY ARTERY W/OUT ANGINA PECTORIS: ICD-10-CM

## 2025-06-03 PROCEDURE — G2211 COMPLEX E/M VISIT ADD ON: CPT | Mod: NC

## 2025-06-03 PROCEDURE — 99214 OFFICE O/P EST MOD 30 MIN: CPT

## 2025-06-03 PROCEDURE — 93000 ELECTROCARDIOGRAM COMPLETE: CPT

## 2025-07-02 ENCOUNTER — RX RENEWAL (OUTPATIENT)
Age: 60
End: 2025-07-02